# Patient Record
(demographics unavailable — no encounter records)

---

## 2025-07-30 NOTE — HISTORY OF PRESENT ILLNESS
[FreeTextEntry1] : HPI: 73-year-old female presents today for an initial cognitive evaluation. The appointment was scheduled by her family. The patient was very upset during the visit and reported not feeling well enough to answer all questions of the assessment.  She has been experiencing cognitive symptoms for approximately one year, primarily repeating questions.  Family history of dementia: Mother had memory impairment towards the end of her life; father had Parkinson's disease. Hallucinations or delusions: Denies. Recent falls or hospitalizations: None. Behavioral or personality changes: None. Living situation: Lives with , son, and son's wife. 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 Home health aide: None. Past Medical History:  Syncope Hyperlipidemia Cognitive/Functional Review:  Memory: Short-term memory loss Speech: Intact Orientation: Intact Decision making / Executive function / Multitasking: Intact Sleep: Normal Appetite: Normal Motor symptoms: None Bowel/Bladder: Continent Psychiatric symptoms: Reports increased depression Functional Status:  Baez Index of [place] in Activities of Daily Living: Total = 12/12 Bathing/Showerin Dressin Toiletin Transferrin Continence: 2 Feedin [place] Instrumental Activities of Daily Living: Total = 16/16 Ability to Use Telephone: 2 Shoppin Food Preparation: 2 Housekeepin Laundry: 2 Transportation: 2 Responsibility for Own Medications: 2 Ability to Handle Finances: 2 Professional/Provider Status:  Now retired PCP: Souleymane Almeida Psychiatrist: None Oncologist: None Rheumatologist: None  Social History: 5 children; 10th grandchild on the way Workup: None documented to date.   Yes

## 2025-07-30 NOTE — PHYSICAL EXAM
[General Appearance - Alert] : alert [General Appearance - In No Acute Distress] : in no acute distress [Oriented To Time, Place, And Person] : oriented to person, place, and time [Impaired Insight] : insight and judgment were intact [Affect] : the affect was normal [Person] : oriented to person [Place] : oriented to place [Time] : oriented to time [Span Intact] : the attention span was normal [Concentration Intact] : normal concentrating ability [Visual Intact] : visual attention was ~T not ~L decreased [Naming Objects] : no difficulty naming common objects [Repeating Phrases] : no difficulty repeating a phrase [Writing A Sentence] : no difficulty writing a sentence [Fluency] : fluency intact [Comprehension] : comprehension intact [Reading] : reading intact [Current Events] : adequate knowledge of current events [Past History] : adequate knowledge of personal past history [Vocabulary] : adequate range of vocabulary [Total Score ___ / 30] : the patient achieved a score of [unfilled] /30 [Date / Time ___ / 5] : date / time [unfilled] / 5 [Place ___ / 5] : place [unfilled] / 5 [Registration ___ / 3] : registration [unfilled] / 3 [Serial Sevens ___/5] : serial sevens [unfilled] / 5 [Naming 2 Objects ___ / 2] : naming two objects [unfilled] / 2 [Repeating a Sentence ___ / 1] : repeating a sentence [unfilled] / 1 [Writing a Sentence ___ / 1] : write sentence [unfilled] / 1 [3-stage Verbal Command ___ / 3] : three-stage verbal command [unfilled] / 3 [Written Command ___ / 1] : written command [unfilled] / 1 [Copy a Design ___ / 1] : copy a design [unfilled] / 1 [Recall ___ / 3] : recall [unfilled] / 3 [Cranial Nerves Optic (II)] : visual acuity intact bilaterally,  visual fields full to confrontation, pupils equal round and reactive to light [Cranial Nerves Oculomotor (III)] : extraocular motion intact [Cranial Nerves Trigeminal (V)] : facial sensation intact symmetrically [Cranial Nerves Facial (VII)] : face symmetrical [Cranial Nerves Vestibulocochlear (VIII)] : hearing was intact bilaterally [Cranial Nerves Glossopharyngeal (IX)] : tongue and palate midline [Cranial Nerves Accessory (XI - Cranial And Spinal)] : head turning and shoulder shrug symmetric [Cranial Nerves Hypoglossal (XII)] : there was no tongue deviation with protrusion [Motor Tone] : muscle tone was normal in all four extremities [Motor Strength] : muscle strength was normal in all four extremities [No Muscle Atrophy] : normal bulk in all four extremities [Motor Handedness Right-Handed] : the patient is right hand dominant [Sensation Tactile Decrease] : light touch was intact [Sensation Pain / Temperature Decrease] : pain and temperature was intact [Balance] : balance was intact [2+] : Brachioradialis right 2+ [1+] : Patella left 1+ [Sclera] : the sclera and conjunctiva were normal [PERRL With Normal Accommodation] : pupils were equal in size, round, reactive to light, with normal accommodation [Extraocular Movements] : extraocular movements were intact [Neck Appearance] : the appearance of the neck was normal [Neck Cervical Mass (___cm)] : no neck mass was observed [] : no respiratory distress [Exaggerated Use Of Accessory Muscles For Inspiration] : no accessory muscle use [No CVA Tenderness] : no ~M costovertebral angle tenderness [Abnormal Walk] : normal gait [Skin Color & Pigmentation] : normal skin color and pigmentation [FreeTextEntry1] : 9/11 correct  Alternating Pattern: ok Spiral: ok Clock: ok - needs redirecting Repetition: ok  R/L discrimination on self and examiner: ok Cross-line commands: ok  -Motor: ok -Dynamic/Luria: ok -Ideomotor/Imitation: ok -Ideational/writing/closing-in: ok -Dressing: ok.  RW digit span: -4-9-3 - ok -3-8-1-4 - ok -6-2-9-7-1 - ok  -7-1-8-4-6-2 - ok   [Romberg's Sign] : Romberg's sign was negtive [Past-pointing] : there was no past-pointing [Tremor] : no tremor present [Coordination - Dysmetria Impaired Finger-to-Nose Bilateral] : not present [Outer Ear] : the ears and nose were normal in appearance [Hearing Threshold Finger Rub Not [place]] : hearing was normal [Examination Of The Oral Cavity] : the lips and gums were normal

## 2025-07-30 NOTE — ASSESSMENT
[FreeTextEntry1] : 73-year-old female presenting for initial cognitive evaluation. MMSE 26/30, likely impacted by emotional distress as she was visibly upset and tearful during the visit. She denies pseudobulbar signs (e.g., inappropriate laughing or crying); emotional response appears situational. Patient repeatedly mentioned the same details (e.g., number of children, recent group home, upcoming grandchild), consistent with short-term memory impairment. Functional status remains intact (Baez 12/12, [place] 16/16). Reports increased depression but denies hallucinations, delusions, or significant behavioral/personality changes.  Plan: 1. Will reassess cognitive status at a follow-up visit when patient is calmer. 2. Refused formal neuropsychological testing for a more comprehensive assessment. 3. Mood management by PCP who prescribes sertraline - advised to speak to PCP for optimization of med or addition of another agent.  4. Baseline labs to rule out reversible causes of cognitive impairment 5. Educated patient and family on importance of evaluation despite patient's reluctance. 6. MRI Brain 3D  7. Life style modifications: diet, socialization, word puzzles etc. - patient is very active at her [employer] and arranges events - continue to do so   Encourage family to monitor for functional decline, new behavioral symptoms, or increased forgetfulness.  Advised to keep daily routines consistent to reduce confusion and anxiety. Use clear, simple communication, speaking slowly and giving one-step instructions. Encourage independence in safe activities while providing assistance as needed. Create a calm, clutter-free space to minimize overstimulation. Engage in activities that promote cognitive function, such as music, puzzles, word games, and reminiscing. Ensure proper nutrition, hydration, and regular physical activity. Safety is ordaz.    During this patient encounter, I dedicated time to preparing for, providing, and documenting the E/M service, which included extensive counseling and education regarding the differential diagnosis, workup, disease course, and treatment/management options. The patient was specifically educated on the adverse effects of prescribed and recommended medications, including pertinent black box warnings and teratogenicity. All questions and concerns were thoroughly addressed, and the patient verbalized understanding and agreed to the proposed plan. They were advised to monitor for any neurologic symptoms and to go to the nearest emergency room/call 911 if symptoms worsen or new symptoms develop. Additionally, any necessary orders, referrals, and communications were provided.   Total time spent on today's visit, including review of records and counseling, was approximately 62 minutes